# Patient Record
Sex: MALE | Race: WHITE | NOT HISPANIC OR LATINO | URBAN - METROPOLITAN AREA
[De-identification: names, ages, dates, MRNs, and addresses within clinical notes are randomized per-mention and may not be internally consistent; named-entity substitution may affect disease eponyms.]

---
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
## 2021-05-31 VITALS — BODY MASS INDEX: 21.06 KG/M2 | WEIGHT: 153.1 LBS

## 2021-06-15 NOTE — PROGRESS NOTES
Assessment     1. Bronchitis    2. Sinusitis        Plan:     Concern for progression to sinusitis at this point.   Also some mild wheezing on exam still  Will treat with azithromycin to cover possible bacterial bronchitis as well  Continue albuterol as needed for the next few days then ok to stop  Discussed supportive care as below and reviewed reasons to RTC.      Patient Instructions     Your child has been diagnosed with bronchitis and a sinus infection    Take the antibiotics as prescribed for the full coarse.    Can continue the albuterol as needed for the next few days.     You may give a probiotic daily to help with any possible diarrhea or stomach ache that may occur from taking the antibiotic.    Encourage plenty of fluids and rest.    Provide supportive care including nasal saline, humidifier in the bedroom, steam showers, and elevating the head of the bed.    Can give a teaspoon of honey before bed to help with the cough.     You may give tylenol and/or ibuprofen as needed for pain and fever (see dosing chart).    Return to clinic if fevers have not resolved within 48 hours of starting the antibiotic, symptoms worsen, signs of dehydration, or any new concerning symptoms.    1/11/2018  Wt Readings from Last 1 Encounters:   01/11/18 153 lb 1.6 oz (69.4 kg) (58 %, Z= 0.21)*     * Growth percentiles are based on CDC 2-20 Years data.       Acetaminophen Dosing Instructions  (May take every 4-6 hours)      WEIGHT   AGE Infant/Children's  160mg/5ml Children's   Chewable Tabs  80 mg each Christopher Strength  Chewable Tabs  160 mg     Milliliter (ml) Soft Chew Tabs Chewable Tabs   6-11 lbs 0-3 months 1.25 ml     12-17 lbs 4-11 months 2.5 ml     18-23 lbs 12-23 months 3.75 ml     24-35 lbs 2-3 years 5 ml 2 tabs    36-47 lbs 4-5 years 7.5 ml 3 tabs    48-59 lbs 6-8 years 10 ml 4 tabs 2 tabs   60-71 lbs 9-10 years 12.5 ml 5 tabs 2.5 tabs   72-95 lbs 11 years 15 ml 6 tabs 3 tabs   96 lbs and over 12 years   4 tabs        Ibuprofen Dosing Instructions- Tablets/Caplets  (May take every 6-8 hours)    WEIGHT AGE Children's   Chewable Tabs   50 mg Christopher Strength   Chewable Tabs   100 mg Christopher Strength   Caplets    100 mg     Tablet Tablet Caplet   24-35 lbs 2-3 years 2 tabs     36-47 lbs 4-5 years 3 tabs     48-59 lbs 6-8 years 4 tabs 2 tabs 2 caps   60-71 lbs 9-10 years 5 tabs 2.5 tabs 2.5 caps   72-95 lbs 11 years 6 tabs 3 tabs 3 caps                   Subjective:      HPI: Mark Pokl is a 17 y.o. male  - Started with cough end of Nov. [facility] and had wheezing and diagnosed with viral illness. Was told to use albuterol every 4 hours. Did help initially. Using it less and less over time. Last used yesterday at 5 pm. Feeling good today. Still occ cough but better. Still with persistent congestion since then and this has actually been worsening. Having worsening headaches since then. Having thicker yellow nasal congestion. No fevers. Has been a little more tired. No sore throat, ear pain, stomach ache. Good PO intake, normal UOP. No albuterol use in the past. No family hx of asthma.    Past Medical History:   Diagnosis Date     Migraines      Past Surgical History:   Procedure Laterality Date     NO PAST SURGERIES       Review of patient's allergies indicates no known allergies.  No outpatient prescriptions prior to visit.     No facility-administered medications prior to visit.      Family History   Problem Relation Age of Onset     No Medical Problems Mother      Factor V Leiden deficiency Father      Factor V Leiden deficiency Sister      Congenital heart disease Sister      VSD - spontaneous closure     Stroke Maternal Grandmother      Heart disease Paternal Grandmother      Pacemaker Paternal Grandmother      Heart disease Paternal Grandfather      Pacemaker Paternal Grandfather      Diabetes Paternal Grandfather      Social History     Social History Narrative     Patient Active Problem List    Diagnosis   (none) - all problems resolved or deleted       Review of Systems  Gen: fatigue.No fever  Eyes: No eye discharge.   ENT: nasal congestion. No pharyngitis. No otalgia.  Resp: cough, wheezing. No SOB  GI:No diarrhea, nausea or vomiting. No constipation.  :No dysuria, normal UOP  MS: No joint/bone/muscle tenderness.  Skin: No rashes  Neuro: headache. Normal  Lymph/Hematologic: No gland swelling    No results found for this or any previous visit (from the past 240 hour(s)).    Objective:     Vitals:    01/11/18 1127   Pulse: 63   Temp: 98.4  F (36.9  C)   TempSrc: Oral   SpO2: 100%   Weight: 153 lb 1.6 oz (69.4 kg)       Physical Exam:   Gen - Alert, no acute distress.   HEENT - conjunctivae are clear, TMs are without erythema, pus or fluid. Position and landmarks are normal.  Nose with purulent nasal congestion.  Oropharynx is moist and clear, without tonsillar hypertrophy, asymmetry, exudate or lesions. Post-nasal drip.  Neck - supple without adenopathy or thyromegaly.  Lungs - Scattered intermittent exp wheezes bilaterally. No crackles. No tachypnea, retractions, or increased work of breathing.  Cardiac - regular rate and rhythm, normal S1 and S2.  Skin - clear without rash  Neuro -  moving all extremities equally, normal muscle tone in all 4 extremities    Pita Montalvo MD